# Patient Record
Sex: MALE | Race: WHITE | NOT HISPANIC OR LATINO | Employment: OTHER | URBAN - METROPOLITAN AREA
[De-identification: names, ages, dates, MRNs, and addresses within clinical notes are randomized per-mention and may not be internally consistent; named-entity substitution may affect disease eponyms.]

---

## 2018-07-17 ENCOUNTER — HOSPITAL ENCOUNTER (EMERGENCY)
Facility: HOSPITAL | Age: 23
Discharge: HOME | End: 2018-07-17
Attending: EMERGENCY MEDICINE
Payer: MEDICAID

## 2018-07-17 VITALS
SYSTOLIC BLOOD PRESSURE: 140 MMHG | DIASTOLIC BLOOD PRESSURE: 73 MMHG | BODY MASS INDEX: 18.96 KG/M2 | HEART RATE: 116 BPM | TEMPERATURE: 99 F | OXYGEN SATURATION: 100 % | HEIGHT: 72 IN | RESPIRATION RATE: 16 BRPM | WEIGHT: 140 LBS

## 2018-07-17 DIAGNOSIS — L02.31 ABSCESS OF BUTTOCK, RIGHT: Primary | ICD-10-CM

## 2018-07-17 PROCEDURE — 99283 EMERGENCY DEPT VISIT LOW MDM: CPT | Mod: 25

## 2018-07-17 PROCEDURE — 25000003 PHARM REV CODE 250: Performed by: EMERGENCY MEDICINE

## 2018-07-17 PROCEDURE — 10061 I&D ABSCESS COMP/MULTIPLE: CPT | Performed by: EMERGENCY MEDICINE

## 2018-07-17 RX ORDER — LIDOCAINE HYDROCHLORIDE AND EPINEPHRINE 10; 10 MG/ML; UG/ML
1 INJECTION, SOLUTION INFILTRATION; PERINEURAL
Status: COMPLETED | OUTPATIENT
Start: 2018-07-17 | End: 2018-07-17

## 2018-07-17 RX ADMIN — LIDOCAINE HYDROCHLORIDE AND EPINEPHRINE 1 ML: 10; 10 INJECTION, SOLUTION INFILTRATION; PERINEURAL at 08:07

## 2018-07-18 NOTE — ED PROVIDER NOTES
"Encounter Date: 7/17/2018    SCRIBE #1 NOTE: I, Israel Lipscomb, am scribing for, and in the presence of, Dr. Robledo .       History     Chief Complaint   Patient presents with    Tailbone Pain     Pain since Saturday night - pt reports pain after an awkward position during sex       Time seen by provider: 8:19 PM on 07/17/2018    Austen M Lejeune is a 22 y.o. male with no pertinent PMHx who presents to the ED with c/o tailbone pain s/p injury 3 days ago. Pt broke the toilet seat while having sexual intercourse for approximately 5 minutes with a 150 lb partner on top. He has been taking Ibuprofen which "takes the edge off." Pt denies nausea and vomiting. He is able to have bowel movements with no complications. NKDA noted.       The history is provided by the patient.     Review of patient's allergies indicates:  No Known Allergies  No past medical history on file.  No past surgical history on file.  No family history on file.  Social History   Substance Use Topics    Smoking status: Not on file    Smokeless tobacco: Not on file    Alcohol use Not on file     Review of Systems   Constitutional: Negative for fever.   HENT: Negative for sore throat.    Eyes: Negative for redness.   Respiratory: Negative for shortness of breath.    Cardiovascular: Negative for chest pain.   Gastrointestinal: Negative for nausea and vomiting.   Genitourinary: Negative for dysuria.   Musculoskeletal: Negative for back pain.        + for "tailbone pain"   Skin: Negative for rash.   Neurological: Negative for weakness.   Hematological: Does not bruise/bleed easily.       Physical Exam     Initial Vitals [07/17/18 2000]   BP Pulse Resp Temp SpO2   (!) 140/73 (!) 116 16 99.3 °F (37.4 °C) 100 %      MAP       --         Physical Exam    Nursing note and vitals reviewed.  Constitutional: He appears well-developed and well-nourished. No distress.   HENT:   Head: Normocephalic and atraumatic.   Eyes: Conjunctivae and EOM are normal. Pupils are " equal, round, and reactive to light.   Neck: Neck supple.   Cardiovascular: Normal rate, regular rhythm and normal heart sounds. Exam reveals no gallop and no friction rub.    No murmur heard.  Pulmonary/Chest: Breath sounds normal. No respiratory distress. He has no wheezes. He has no rhonchi. He has no rales.   Abdominal: Soft. Bowel sounds are normal. He exhibits no distension. There is no tenderness.   Musculoskeletal: Normal range of motion. He exhibits no edema or tenderness.   Neurological: He is alert and oriented to person, place, and time.   Skin: Skin is warm and dry. There is erythema.   Abscess with redness along gluteal cleft top aspect on the right.    Psychiatric: He has a normal mood and affect.         ED Course   I & D - Incision and Drainage  Performed by: FELICIANO NUNEZ  Authorized by: FELICIANO NUNEZ   Consent Done: Yes  Consent: Verbal consent obtained.  Risks and benefits: risks, benefits and alternatives were discussed  Consent given by: patient  Type: abscess  Body area: anogenital  Location details: gluteal cleft  Anesthesia: local infiltration    Anesthesia:  Local Anesthetic: lidocaine 1% with epinephrine  Anesthetic total: 3 mL  Patient sedated: no  Incision type: single straight  Complexity: complex  Drainage: pus  Drainage amount: moderate  Wound treatment: deloculation,  drainage,  incision and  wound packed  Packing material: 1/4 in gauze  Complications: No  Specimens: No  Implants: No  Patient tolerance: Patient tolerated the procedure well with no immediate complications        Labs Reviewed - No data to display       Imaging Results    None          Medical Decision Making:   History:   Old Medical Records: I decided to obtain old medical records.  Initial Assessment:   22-year-old male presented with a chief complaint of tailbone pain.  Differential Diagnosis:   My differential diagnosis includes fx, contusion, and abscess.     ED Management:  The patient was emergently  evaluated in the emergency department, his evaluation was significant for a young male with an abscess noted along the gluteal cleft.  The patient reports that this is where his pain is.  The patient received an incision and drainage of the abscess and tolerated the procedure well. The patient is stable for discharge to home.  The patient does not need any antibiotics at this time.  The patient is to follow up with his PCP in 3 days for packing removal and wound recheck.            Scribe Attestation:   Scribe #1: I performed the above scribed service and the documentation accurately describes the services I performed. I attest to the accuracy of the note.           I, Dr. Neil Robledo, personally performed the services described in this documentation. All medical record entries made by the scribe were at my direction and in my presence.  I have reviewed the chart and agree that the record reflects my personal performance and is accurate and complete. Neil Robledo MD.  9:12 PM 07/17/2018       Clinical Impression:     1. Abscess of buttock, right                                 Neil Robledo MD  07/17/18 7390

## 2019-05-24 ENCOUNTER — HOSPITAL ENCOUNTER (EMERGENCY)
Facility: HOSPITAL | Age: 24
Discharge: HOME | End: 2019-05-24
Attending: EMERGENCY MEDICINE

## 2019-05-24 VITALS
DIASTOLIC BLOOD PRESSURE: 83 MMHG | WEIGHT: 140 LBS | BODY MASS INDEX: 18.99 KG/M2 | RESPIRATION RATE: 16 BRPM | SYSTOLIC BLOOD PRESSURE: 123 MMHG | TEMPERATURE: 98 F | HEART RATE: 86 BPM | OXYGEN SATURATION: 100 %

## 2019-05-24 DIAGNOSIS — S61.411A LACERATION OF RIGHT HAND WITHOUT FOREIGN BODY, INITIAL ENCOUNTER: Primary | ICD-10-CM

## 2019-05-24 PROCEDURE — 12001 RPR S/N/AX/GEN/TRNK 2.5CM/<: CPT | Mod: 59

## 2019-05-24 PROCEDURE — 25000003 PHARM REV CODE 250: Performed by: PHYSICIAN ASSISTANT

## 2019-05-24 PROCEDURE — 12041 INTMD RPR N-HF/GENIT 2.5CM/<: CPT

## 2019-05-24 PROCEDURE — 90715 TDAP VACCINE 7 YRS/> IM: CPT | Performed by: PHYSICIAN ASSISTANT

## 2019-05-24 PROCEDURE — 63600175 PHARM REV CODE 636 W HCPCS: Performed by: PHYSICIAN ASSISTANT

## 2019-05-24 PROCEDURE — 99284 EMERGENCY DEPT VISIT MOD MDM: CPT | Mod: 25

## 2019-05-24 PROCEDURE — 90471 IMMUNIZATION ADMIN: CPT | Performed by: PHYSICIAN ASSISTANT

## 2019-05-24 RX ORDER — LIDOCAINE HYDROCHLORIDE 10 MG/ML
10 INJECTION INFILTRATION; PERINEURAL
Status: COMPLETED | OUTPATIENT
Start: 2019-05-24 | End: 2019-05-24

## 2019-05-24 RX ADMIN — CLOSTRIDIUM TETANI TOXOID ANTIGEN (FORMALDEHYDE INACTIVATED), CORYNEBACTERIUM DIPHTHERIAE TOXOID ANTIGEN (FORMALDEHYDE INACTIVATED), BORDETELLA PERTUSSIS TOXOID ANTIGEN (GLUTARALDEHYDE INACTIVATED), BORDETELLA PERTUSSIS FILAMENTOUS HEMAGGLUTININ ANTIGEN (FORMALDEHYDE INACTIVATED), BORDETELLA PERTUSSIS PERTACTIN ANTIGEN, AND BORDETELLA PERTUSSIS FIMBRIAE 2/3 ANTIGEN 0.5 ML: 5; 2; 2.5; 5; 3; 5 INJECTION, SUSPENSION INTRAMUSCULAR at 07:05

## 2019-05-24 RX ADMIN — BACITRACIN ZINC, POLYMYXIN B SULFATE, NEOMYCIN SULFATE 1 EACH: 400; 5000; 3.5 OINTMENT TOPICAL at 08:05

## 2019-05-24 RX ADMIN — LIDOCAINE HYDROCHLORIDE 10 ML: 10 INJECTION, SOLUTION INFILTRATION; PERINEURAL at 07:05

## 2019-05-24 NOTE — ED PROVIDER NOTES
Encounter Date: 5/24/2019    SCRIBE #1 NOTE: I, Renetta Delgado and am scribing for, and in the presence of, Isamar Campoverde PA-C.       History     Chief Complaint   Patient presents with    Laceration     right index with glass     Time seen by provider: 6:48 PM on 05/24/2019    Austen M Lejeune is a 23 y.o. male who presents to the ED s/p an injury to his right index finger occurring 45 minutes PTA. The patient reports that he punched through a television screen, and he currently still sees glass in the cut. The patient and his mom are not entirely sure when his last tetanus shot was, but they think it may have been 5-6 years ago. The patient denies any other symptoms at this time. No PSHx of the hands noted. Patient is a current smoker. No known drug allergies noted.    The history is provided by the patient and a parent.     Review of patient's allergies indicates:  No Known Allergies  History reviewed. No pertinent past medical history.  History reviewed. No pertinent surgical history.  History reviewed. No pertinent family history.  Social History     Tobacco Use    Smoking status: Current Every Day Smoker     Types: Cigarettes   Substance Use Topics    Alcohol use: Not on file    Drug use: Not on file     Review of Systems   Constitutional: Negative for chills and fever.   HENT: Negative for facial swelling and trouble swallowing.    Eyes: Negative for discharge.   Respiratory: Negative for cough, chest tightness, shortness of breath and wheezing.    Cardiovascular: Negative for chest pain and palpitations.   Gastrointestinal: Negative for abdominal pain, diarrhea, nausea and vomiting.   Genitourinary: Negative for dysuria and hematuria.   Musculoskeletal: Negative for arthralgias, back pain, joint swelling, myalgias, neck pain and neck stiffness.   Skin: Positive for wound. Negative for color change, pallor and rash.   Neurological: Negative for dizziness, syncope, weakness, light-headedness,  numbness and headaches.   Hematological: Does not bruise/bleed easily.   Psychiatric/Behavioral: The patient is not nervous/anxious.        Physical Exam     Initial Vitals [05/24/19 1831]   BP Pulse Resp Temp SpO2   123/83 86 16 97.9 °F (36.6 °C) 100 %      MAP       --         Physical Exam    Nursing note and vitals reviewed.  Constitutional: He appears well-developed and well-nourished. He is not diaphoretic. No distress.   Cardiovascular: Intact distal pulses.   Musculoskeletal: Normal range of motion. He exhibits tenderness. He exhibits no edema.   2 cm laceration and 0.5 cm laceration noted to dorsal aspect of right hand along the 2nd and 3rd MCP joints.  No obvious foreign bodies noted.  No decreased ROM, decreased strength or loss of sensation to right hand.  Palpable 2+ radial pulse.  No visualized tendon involvement.  No bony tenderness.    Neurological: He is alert and oriented to person, place, and time. He has normal strength. No sensory deficit.   Skin: Skin is warm and dry. No rash and no abscess noted. No erythema.   Psychiatric: He has a normal mood and affect.         ED Course   Lac Repair  Date/Time: 5/25/2019 1:04 AM  Performed by: Isamra Campoverde PA-C  Authorized by: Jayy Farooq MD   Body area: upper extremity  Location details: right hand  Laceration length: 2 cm  Foreign bodies: glass  Tendon involvement: none  Nerve involvement: none  Vascular damage: no  Anesthesia: local infiltration    Anesthesia:  Local Anesthetic: lidocaine 1% without epinephrine  Anesthetic total: 3 mL  Patient sedated: no  Preparation: Patient was prepped and draped in the usual sterile fashion.  Irrigation solution: saline  Irrigation method: syringe  Amount of cleaning: extensive  Debridement: none  Degree of undermining: none  Skin closure: 4-0 nylon  Subcutaneous closure: 5-0 Vicryl (2)  Number of sutures: 5  Technique: simple  Approximation: close  Approximation difficulty: complex  Dressing:  antibiotic ointment and non-stick sterile dressing  Patient tolerance: Patient tolerated the procedure well with no immediate complications    Lac Repair  Date/Time: 5/25/2019 1:05 AM  Performed by: Isamar Campoverde PA-C  Authorized by: Jayy Farooq MD   Body area: upper extremity  Location details: right hand  Laceration length: 0.5 cm  Foreign bodies: no foreign bodies  Tendon involvement: none  Nerve involvement: none  Vascular damage: no  Anesthesia: local infiltration    Anesthesia:  Local Anesthetic: lidocaine 1% without epinephrine  Anesthetic total: 1 mL  Patient sedated: no  Preparation: Patient was prepped and draped in the usual sterile fashion.  Irrigation solution: saline  Irrigation method: syringe  Amount of cleaning: extensive  Debridement: none  Degree of undermining: none  Skin closure: 4-0 nylon  Number of sutures: 1  Technique: simple  Approximation: close  Approximation difficulty: simple  Dressing: antibiotic ointment and non-stick sterile dressing  Patient tolerance: Patient tolerated the procedure well with no immediate complications        Labs Reviewed - No data to display       Imaging Results    None          Medical Decision Making:   History:   Old Medical Records: I decided to obtain old medical records.  Differential Diagnosis:   Fracture  Dislocation  Sprain  Contusion  Strain  Spasm  Laceration    Clinical Tests:   Radiological Study: Ordered and Reviewed       APC / Resident Notes:   Wounds are well irrigated and scrubbed to remove and residual foreign bodies.  He tolerated the laceration repair well.  No evidence for bony abnormalities or tendon involvement.  He is discharged home to follow-up with his PCP for re-evaluation and suture removal.  He voices understanding and is agreeable to the plan.  He is given specific return precautions. Tetanus is updated today.           Scribe Attestation:   Scribe #1: I performed the above scribed service and the documentation  accurately describes the services I performed. I attest to the accuracy of the note.    I, Isamar Campoverde PA-C, personally performed the services described in this documentation. All medical record entries made by the scribe were at my direction and in my presence.  I have reviewed the chart and agree that the record reflects my personal performance and is accurate and complete. Isamar Campoverde PA-C.  1:10 AM 05/25/2019             Clinical Impression:       ICD-10-CM ICD-9-CM   1. Laceration of right hand without foreign body, initial encounter S61.411A 882.0         Disposition:   Disposition: Discharged  Condition: Stable                        Isamar Campoverde PA-C  05/25/19 0110

## 2023-06-12 ENCOUNTER — TELEPHONE (OUTPATIENT)
Dept: PSYCHIATRY | Facility: CLINIC | Age: 28
End: 2023-06-12
Payer: MEDICAID

## 2023-06-12 NOTE — TELEPHONE ENCOUNTER
Pt called requesting an appt for med management/ADHD. Advised pt that our department requires [facility]. We schedule in the order referral is rec'd and we currently have an extensive wait list. Verbalized understanding. No further questions.